# Patient Record
Sex: MALE | Race: OTHER | HISPANIC OR LATINO | URBAN - METROPOLITAN AREA
[De-identification: names, ages, dates, MRNs, and addresses within clinical notes are randomized per-mention and may not be internally consistent; named-entity substitution may affect disease eponyms.]

---

## 2019-03-07 ENCOUNTER — EMERGENCY (EMERGENCY)
Facility: HOSPITAL | Age: 39
LOS: 1 days | Discharge: HOME | End: 2019-03-07
Attending: EMERGENCY MEDICINE | Admitting: EMERGENCY MEDICINE
Payer: SELF-PAY

## 2019-03-07 VITALS
SYSTOLIC BLOOD PRESSURE: 140 MMHG | HEART RATE: 84 BPM | RESPIRATION RATE: 18 BRPM | DIASTOLIC BLOOD PRESSURE: 85 MMHG | OXYGEN SATURATION: 100 %

## 2019-03-07 VITALS
DIASTOLIC BLOOD PRESSURE: 100 MMHG | OXYGEN SATURATION: 99 % | HEART RATE: 88 BPM | SYSTOLIC BLOOD PRESSURE: 157 MMHG | HEIGHT: 62 IN | TEMPERATURE: 98 F | RESPIRATION RATE: 18 BRPM | WEIGHT: 170.42 LBS

## 2019-03-07 DIAGNOSIS — R10.9 UNSPECIFIED ABDOMINAL PAIN: ICD-10-CM

## 2019-03-07 LAB
ALBUMIN SERPL ELPH-MCNC: 4.2 G/DL — SIGNIFICANT CHANGE UP (ref 3.3–5)
ALP SERPL-CCNC: 76 U/L — SIGNIFICANT CHANGE UP (ref 40–120)
ALT FLD-CCNC: 62 U/L DA — HIGH (ref 10–45)
ANION GAP SERPL CALC-SCNC: 12 MMOL/L — SIGNIFICANT CHANGE UP (ref 5–17)
AST SERPL-CCNC: 34 U/L — SIGNIFICANT CHANGE UP (ref 10–40)
BASOPHILS # BLD AUTO: 0.1 K/UL — SIGNIFICANT CHANGE UP (ref 0–0.2)
BASOPHILS NFR BLD AUTO: 1.1 % — SIGNIFICANT CHANGE UP (ref 0–2)
BILIRUB SERPL-MCNC: 0.4 MG/DL — SIGNIFICANT CHANGE UP (ref 0.2–1.2)
BUN SERPL-MCNC: 17 MG/DL — SIGNIFICANT CHANGE UP (ref 7–23)
CALCIUM SERPL-MCNC: 9.2 MG/DL — SIGNIFICANT CHANGE UP (ref 8.4–10.5)
CHLORIDE SERPL-SCNC: 103 MMOL/L — SIGNIFICANT CHANGE UP (ref 96–108)
CO2 SERPL-SCNC: 26 MMOL/L — SIGNIFICANT CHANGE UP (ref 22–31)
CREAT SERPL-MCNC: 1.06 MG/DL — SIGNIFICANT CHANGE UP (ref 0.5–1.3)
EOSINOPHIL # BLD AUTO: 0.6 K/UL — HIGH (ref 0–0.5)
EOSINOPHIL NFR BLD AUTO: 6.2 % — HIGH (ref 0–6)
GLUCOSE SERPL-MCNC: 143 MG/DL — HIGH (ref 70–99)
HCT VFR BLD CALC: 46.8 % — SIGNIFICANT CHANGE UP (ref 39–50)
HGB BLD-MCNC: 15.9 G/DL — SIGNIFICANT CHANGE UP (ref 13–17)
LIDOCAIN IGE QN: 102 U/L — SIGNIFICANT CHANGE UP (ref 73–393)
LYMPHOCYTES # BLD AUTO: 3.1 K/UL — SIGNIFICANT CHANGE UP (ref 1–3.3)
LYMPHOCYTES # BLD AUTO: 31.9 % — SIGNIFICANT CHANGE UP (ref 13–44)
MCHC RBC-ENTMCNC: 29.8 PG — SIGNIFICANT CHANGE UP (ref 27–34)
MCHC RBC-ENTMCNC: 34 GM/DL — SIGNIFICANT CHANGE UP (ref 32–36)
MCV RBC AUTO: 87.6 FL — SIGNIFICANT CHANGE UP (ref 80–100)
MONOCYTES # BLD AUTO: 0.6 K/UL — SIGNIFICANT CHANGE UP (ref 0–0.9)
MONOCYTES NFR BLD AUTO: 6.6 % — SIGNIFICANT CHANGE UP (ref 1–9)
NEUTROPHILS # BLD AUTO: 5.3 K/UL — SIGNIFICANT CHANGE UP (ref 1.8–7.4)
NEUTROPHILS NFR BLD AUTO: 54.1 % — SIGNIFICANT CHANGE UP (ref 43–77)
PLATELET # BLD AUTO: 207 K/UL — SIGNIFICANT CHANGE UP (ref 150–400)
POTASSIUM SERPL-MCNC: 3.7 MMOL/L — SIGNIFICANT CHANGE UP (ref 3.5–5.3)
POTASSIUM SERPL-SCNC: 3.7 MMOL/L — SIGNIFICANT CHANGE UP (ref 3.5–5.3)
PROT SERPL-MCNC: 8.1 G/DL — SIGNIFICANT CHANGE UP (ref 6–8.3)
RBC # BLD: 5.34 M/UL — SIGNIFICANT CHANGE UP (ref 4.2–5.8)
RBC # FLD: 11.8 % — SIGNIFICANT CHANGE UP (ref 10.3–14.5)
SODIUM SERPL-SCNC: 141 MMOL/L — SIGNIFICANT CHANGE UP (ref 135–145)
WBC # BLD: 9.8 K/UL — SIGNIFICANT CHANGE UP (ref 3.8–10.5)
WBC # FLD AUTO: 9.8 K/UL — SIGNIFICANT CHANGE UP (ref 3.8–10.5)

## 2019-03-07 PROCEDURE — 96374 THER/PROPH/DIAG INJ IV PUSH: CPT

## 2019-03-07 PROCEDURE — 96375 TX/PRO/DX INJ NEW DRUG ADDON: CPT

## 2019-03-07 PROCEDURE — 99284 EMERGENCY DEPT VISIT MOD MDM: CPT | Mod: 25

## 2019-03-07 PROCEDURE — 85027 COMPLETE CBC AUTOMATED: CPT

## 2019-03-07 PROCEDURE — 99053 MED SERV 10PM-8AM 24 HR FAC: CPT

## 2019-03-07 PROCEDURE — 80053 COMPREHEN METABOLIC PANEL: CPT

## 2019-03-07 PROCEDURE — 83690 ASSAY OF LIPASE: CPT

## 2019-03-07 RX ORDER — FAMOTIDINE 10 MG/ML
20 INJECTION INTRAVENOUS ONCE
Qty: 0 | Refills: 0 | Status: COMPLETED | OUTPATIENT
Start: 2019-03-07 | End: 2019-03-07

## 2019-03-07 RX ORDER — ONDANSETRON 8 MG/1
4 TABLET, FILM COATED ORAL ONCE
Qty: 0 | Refills: 0 | Status: COMPLETED | OUTPATIENT
Start: 2019-03-07 | End: 2019-03-07

## 2019-03-07 RX ORDER — SODIUM CHLORIDE 9 MG/ML
1000 INJECTION INTRAMUSCULAR; INTRAVENOUS; SUBCUTANEOUS ONCE
Qty: 0 | Refills: 0 | Status: COMPLETED | OUTPATIENT
Start: 2019-03-07 | End: 2019-03-07

## 2019-03-07 RX ORDER — LANSOPRAZOLE 15 MG/1
1 CAPSULE, DELAYED RELEASE ORAL
Qty: 30 | Refills: 0
Start: 2019-03-07 | End: 2019-04-05

## 2019-03-07 RX ADMIN — Medication 30 MILLILITER(S): at 04:38

## 2019-03-07 RX ADMIN — ONDANSETRON 4 MILLIGRAM(S): 8 TABLET, FILM COATED ORAL at 04:39

## 2019-03-07 RX ADMIN — FAMOTIDINE 100 MILLIGRAM(S): 10 INJECTION INTRAVENOUS at 04:38

## 2019-03-07 RX ADMIN — SODIUM CHLORIDE 2000 MILLILITER(S): 9 INJECTION INTRAMUSCULAR; INTRAVENOUS; SUBCUTANEOUS at 04:39

## 2019-03-07 NOTE — ED PROVIDER NOTE - OBJECTIVE STATEMENT
39 y/o male with no sig pmhx presents to ed c/o sudden onset epigastric pain, burning type, no n/v/d /c, no fever

## 2019-03-07 NOTE — ED ADULT NURSE NOTE - NSIMPLEMENTINTERV_GEN_ALL_ED
Implemented All Universal Safety Interventions:  [place] to call system. Call bell, personal items and telephone within reach. Instruct patient to call for assistance. Room bathroom lighting operational. Non-slip footwear when patient is off stretcher. Physically safe environment: no spills, clutter or unnecessary equipment. Stretcher in lowest position, wheels locked, appropriate side rails in place.

## 2019-03-07 NOTE — ED PROVIDER NOTE - CLINICAL SUMMARY MEDICAL DECISION MAKING FREE TEXT BOX
pt has epigastric pain likely from gastritis, improved after IV pepcid, will discharge on PPI and ask him to follow up in clinic

## 2019-03-07 NOTE — ED PROVIDER NOTE - NSFOLLOWUPINSTRUCTIONS_ED_ALL_ED_FT
Drink plenty of liquids and take enough rest. if you are given any prescription take it as recommended. take Tylenol  for pain . Follow up with your primary care doctor or at family practice clinic in 1-2days. Return to ER if symptoms worsens or do not improve. WE are open 24 hours every day

## 2019-03-07 NOTE — ED ADULT NURSE NOTE - OBJECTIVE STATEMENT
Pt presents to the ED complaining of epigastric pain 8/10. Pain started about one hour ago. Pt denies nausea, vomiting and diarrhea. Pt with no prior medical history

## 2023-06-25 ENCOUNTER — EMERGENCY (EMERGENCY)
Facility: HOSPITAL | Age: 43
LOS: 1 days | Discharge: HOME | End: 2023-06-25
Attending: EMERGENCY MEDICINE | Admitting: EMERGENCY MEDICINE
Payer: MEDICAID

## 2023-06-25 VITALS
OXYGEN SATURATION: 96 % | TEMPERATURE: 98 F | DIASTOLIC BLOOD PRESSURE: 81 MMHG | HEART RATE: 86 BPM | SYSTOLIC BLOOD PRESSURE: 118 MMHG | RESPIRATION RATE: 17 BRPM | HEIGHT: 63 IN | WEIGHT: 160.06 LBS

## 2023-06-25 LAB — GLUCOSE BLDC GLUCOMTR-MCNC: 106 MG/DL — HIGH (ref 70–99)

## 2023-06-25 PROCEDURE — 99283 EMERGENCY DEPT VISIT LOW MDM: CPT

## 2023-06-25 PROCEDURE — 99284 EMERGENCY DEPT VISIT MOD MDM: CPT

## 2023-06-25 PROCEDURE — 96372 THER/PROPH/DIAG INJ SC/IM: CPT

## 2023-06-25 PROCEDURE — 82962 GLUCOSE BLOOD TEST: CPT

## 2023-06-25 RX ORDER — IBUPROFEN 200 MG
1 TABLET ORAL
Qty: 20 | Refills: 0
Start: 2023-06-25 | End: 2023-06-29

## 2023-06-25 RX ORDER — KETOROLAC TROMETHAMINE 30 MG/ML
30 SYRINGE (ML) INJECTION ONCE
Refills: 0 | Status: DISCONTINUED | OUTPATIENT
Start: 2023-06-25 | End: 2023-06-25

## 2023-06-25 RX ADMIN — Medication 300 MILLIGRAM(S): at 16:25

## 2023-06-25 RX ADMIN — Medication 30 MILLIGRAM(S): at 16:26

## 2023-06-25 NOTE — ED PROVIDER NOTE - PATIENT PORTAL LINK FT
You can access the FollowMyHealth Patient Portal offered by [facility] by registering at the following website: [email or web address]. By joining [employer]’s FollowMyHealth portal, you will also be able to view your health information using other applications (apps) compatible with our system.

## 2023-06-25 NOTE — ED PROVIDER NOTE - PHYSICAL EXAMINATION
Dental exam: #4 along the gingiva with local tenderness.  Erythema.  Right-sided upper facial swelling is noted on examination.  Patient in no acute distress.  Well-appearing and nontoxic.No lymphadenopathy appreciated on exam.

## 2023-06-25 NOTE — ED ADULT NURSE NOTE - NSFALLUNIVINTERV_ED_ALL_ED
Bed/Stretcher in lowest position, wheels locked, appropriate side rails in place/Call bell, personal items and telephone in reach/Instruct patient to call for assistance before getting out of bed/chair/stretcher/Non-slip footwear applied when patient is off stretcher/[place] to call system/Physically safe environment - no spills, clutter or unnecessary equipment/Purposeful proactive rounding/Room/bathroom lighting operational, light cord in reach

## 2023-06-25 NOTE — ED PROVIDER NOTE - NSFOLLOWUPINSTRUCTIONS_ED_ALL_ED_FT
Es probable que haya gay infección dental. Es importante que farzad un seguimiento con un dentista. Nos comunicaremos con usted mañana para ayudarlo con el seguimiento con gay ganesh con el dentista. Si no podemos programarlo con un dentista, incluiré gay lista de clínicas dentales a las que puede llamar para programar un seguimiento. Mientras tanto, tome los antibióticos según lo prescrito. También hemos proporcionado medicamentos según sea necesario para el dolor y la inflamación.

## 2023-06-25 NOTE — ED PROVIDER NOTE - CLINICAL SUMMARY MEDICAL DECISION MAKING FREE TEXT BOX
42-year-old male presents to the ED complaining of right upper facial swelling.  Patient denies pain.  He states that it started yesterday morning after breakfast.  He might have noted that he had some pain with chewing.  There is no fever or chills.  No trouble breathing.  No nausea or vomiting.  Patient does not have a relationship with a dentist at this moment.    Exam as stated.  Information entered into the green binder for follow-up with dentist.  Will provide antibiotics and anti-inflammatories as needed.  We will give patient a list of dental clinics to arrange follow-up outpatient.  All questions answered.

## 2023-06-25 NOTE — ED PROVIDER NOTE - OBJECTIVE STATEMENT
42-year-old male presents to the ED complaining of right upper facial swelling.  Patient denies pain.  He states that it started yesterday morning after breakfast.  He might have noted that he had some pain with chewing.  There is no fever or chills.  No trouble breathing.  No nausea or vomiting.  Patient does not have a relationship with a dentist at this moment.

## 2023-06-25 NOTE — ED PROVIDER NOTE - NSFOLLOWUPCLINICS_GEN_ALL_ED_FT
[facility]  Dental  [address] [place] [address]  Phone: [phone]  Fax:     [facility]  Dentistry  .  [place] [address]  Phone: [phone]  Fax:     [facility]  Dental  [address] [place] [id number]  Phone: [phone]  Fax:     [facility]  [place] [address]  Phone: [phone]  Fax:

## 2023-06-25 NOTE — ED PROVIDER NOTE - NSFOLLOWUPCLINICSTOKEN_GEN_ALL_ED_FT
[id number]: || ||00\01||False;[id number]: || ||00\01||False;[id number]: || ||00\01||False;[id number]: || ||00\01||False;

## 2023-06-25 NOTE — ED ADULT NURSE NOTE - OBJECTIVE STATEMENT
Assumed pt care for a 42 yr old male complaining of right sided facial swelling for about two days now. Pt reports he noted a lot of swelling to the right upper jaw. Pt reports a metallic taste/sour taste in mouth. Pt denies any allergies to anything. Pt denies any fever or chills. Meds given as per order.

## 2023-06-26 ENCOUNTER — EMERGENCY (EMERGENCY)
Facility: HOSPITAL | Age: 43
LOS: 1 days | Discharge: HOME | End: 2023-06-26
Attending: EMERGENCY MEDICINE | Admitting: EMERGENCY MEDICINE
Payer: MEDICAID

## 2023-06-26 VITALS
HEART RATE: 87 BPM | RESPIRATION RATE: 18 BRPM | DIASTOLIC BLOOD PRESSURE: 83 MMHG | WEIGHT: 169.98 LBS | SYSTOLIC BLOOD PRESSURE: 163 MMHG | HEIGHT: 63 IN | OXYGEN SATURATION: 98 % | TEMPERATURE: 99 F

## 2023-06-26 PROCEDURE — 99284 EMERGENCY DEPT VISIT MOD MDM: CPT

## 2023-06-26 NOTE — ED ADULT TRIAGE NOTE - BIRTH SEX
PROVIDER:[TOKEN:[[id number]],FOLLOWUP:[1-3 Days]] Male PROVIDER:[TOKEN:[[id number]],FOLLOWUP:[1-3 Days]],PROVIDER:[TOKEN:[[id number]],FOLLOWUP:[1-3 Days]],PROVIDER:[TOKEN:[[id number]],FOLLOWUP:[1-3 Days]]

## 2023-06-26 NOTE — ED ADULT TRIAGE NOTE - CHIEF COMPLAINT QUOTE
pt  seen  here yesterday with dental issues he was prescribed  clindanycin  and motrin   he presents tonight with right sided abd pain  starting 2 hours ago he states last bm was 2 hours ago

## 2023-06-27 PROCEDURE — 99283 EMERGENCY DEPT VISIT LOW MDM: CPT

## 2023-06-27 RX ORDER — LIDOCAINE 4 G/100G
10 CREAM TOPICAL ONCE
Refills: 0 | Status: DISCONTINUED | OUTPATIENT
Start: 2023-06-27 | End: 2023-06-30

## 2023-06-27 RX ORDER — AMOXICILLIN 250 MG/5ML
1 SUSPENSION, RECONSTITUTED, ORAL (ML) ORAL
Qty: 30 | Refills: 0
Start: 2023-06-27 | End: 2023-07-06

## 2023-06-27 NOTE — ED PROVIDER NOTE - PATIENT PORTAL LINK FT
You can access the FollowMyHealth Patient Portal offered by [facility] by registering at the following website: [email or web address]. By joining [employer]’s FollowMyHealth portal, you will also be able to view your health information using other applications (apps) compatible with our system.

## 2023-06-27 NOTE — ED PROVIDER NOTE - NSFOLLOWUPINSTRUCTIONS_ED_ALL_ED_FT
-- Stop taking clindamycin and start taking the new prescription of amoxicillin    -- Return to the ER for worsening or persistent symptoms, and/or ANY NEW OR CONCERNING SYMPTOMS.

## 2023-06-27 NOTE — ED PROVIDER NOTE - OBJECTIVE STATEMENT
pt was here for toothache yesterday and was started on clindamycin, pt states that after he took his first dose he had a mild stomach upset which recurred again after the next dose.  This evening he took another dose at 9:30p and 30 min later again had abdominal pain that was much worse, denies any vomiting.

## 2023-06-27 NOTE — ED ADULT NURSE NOTE - NS ED NURSE RECORD ANOTHER VITAL SIGN
Within functional limits Decreased anterior-posterior movement of the bolus/Stasis in lateral sulci Yes

## 2023-07-03 ENCOUNTER — OUTPATIENT (OUTPATIENT)
Dept: OUTPATIENT SERVICES | Facility: HOSPITAL | Age: 43
LOS: 1 days | End: 2023-07-03
Payer: MEDICAID

## 2023-07-03 ENCOUNTER — APPOINTMENT (OUTPATIENT)
Dept: FAMILY MEDICINE | Facility: HOSPITAL | Age: 43
End: 2023-07-03

## 2023-07-03 VITALS
SYSTOLIC BLOOD PRESSURE: 120 MMHG | HEART RATE: 76 BPM | TEMPERATURE: 98.7 F | DIASTOLIC BLOOD PRESSURE: 77 MMHG | RESPIRATION RATE: 17 BRPM | WEIGHT: 167 LBS | OXYGEN SATURATION: 98 % | BODY MASS INDEX: 28.67 KG/M2

## 2023-07-03 DIAGNOSIS — K08.9 DISORDER OF TEETH AND SUPPORTING STRUCTURES, UNSPECIFIED: ICD-10-CM

## 2023-07-03 DIAGNOSIS — Z87.891 PERSONAL HISTORY OF NICOTINE DEPENDENCE: ICD-10-CM

## 2023-07-03 DIAGNOSIS — Z11.59 ENCOUNTER FOR SCREENING FOR OTHER VIRAL DISEASES: ICD-10-CM

## 2023-07-03 DIAGNOSIS — Z11.4 ENCOUNTER FOR SCREENING FOR HUMAN IMMUNODEFICIENCY VIRUS [HIV]: ICD-10-CM

## 2023-07-03 DIAGNOSIS — Z00.00 ENCOUNTER FOR GENERAL ADULT MEDICAL EXAMINATION WITHOUT ABNORMAL FINDINGS: ICD-10-CM

## 2023-07-03 DIAGNOSIS — Z01.818 ENCOUNTER FOR OTHER PREPROCEDURAL EXAMINATION: ICD-10-CM

## 2023-07-03 DIAGNOSIS — R73.9 HYPERGLYCEMIA, UNSPECIFIED: ICD-10-CM

## 2023-07-03 DIAGNOSIS — Z13.220 ENCOUNTER FOR SCREENING FOR LIPOID DISORDERS: ICD-10-CM

## 2023-07-03 NOTE — HISTORY OF PRESENT ILLNESS
[FreeTextEntry1] : ED f/u, CPE, smoking cessation  [de-identified] : 42M h/o R inguinal hernia repaired 2012, here for f/u ED visit and to establish care. Patient was in the ED 6/25/23 complaining of right facial swelling and some pain with chewing, diagnosed with tooth infection given clindamycin. Patient was in the ED the next day 6/26/23 for abdominal pain after taking clindamycin which was reproducible after taking the clindamycin a second time, no nausea or vomiting. He was given amoxicillin instead, discharged again. \par \par Today he reports improvement in his tooth discomfort. He has had multiple procedures for tooth removal 2/2 poor dentition, but has not been to the dentist in many years. He has 4-5 more days of the amoxicillin left. He denies fevers, chills, abd pain, pus drainage from mouth, bad taste in mouth. He is interested in quitting smoking, previously smoked 1 pack per day from age [age] then cut down to 6 cigarettes per day, which he has remained at. Has not tried to completely quit in the past but is interested.

## 2023-07-03 NOTE — PHYSICAL EXAM
[No Acute Distress] : no acute distress [Well Nourished] : well nourished [Well Developed] : well developed [Normal Sclera/Conjunctiva] : normal sclera/conjunctiva [Normal] : no carotid or abdominal bruits heard, no varicosities, pedal pulses are present, no peripheral edema, no extremity clubbing or cyanosis and no palpable aorta [Normal Gait] : normal gait [Normal Affect] : the affect was normal [Normal Insight/Judgement] : insight and judgment were intact

## 2023-07-03 NOTE — HEALTH RISK ASSESSMENT
[Very Good] : ~his/her~  mood as very good [Yes] : Yes [Monthly or less (1 pt)] : Monthly or less (1 point) [3 or 4 (1 pt)] : 3 or 4  (1 point) [Never (0 pts)] : Never (0 points) [No] : In the past 12 months have you used drugs other than those required for medical reasons? No [0] : 2) Feeling down, depressed, or hopeless: Not at all (0) [PHQ-2 Negative - No further assessment needed] : PHQ-2 Negative - No further assessment needed [None] : None [With Family] : lives with family [Fully functional (bathing, dressing, toileting, transferring, walking, feeding)] : Fully functional (bathing, dressing, toileting, transferring, walking, feeding) [Fully functional (using the telephone, shopping, preparing meals, housekeeping, doing laundry, using] : Fully functional and needs no help or supervision to perform IADLs (using the telephone, shopping, preparing meals, housekeeping, doing laundry, using transportation, managing medications and managing finances) [Reports changes in dental health] : Reports changes in dental health [Current] : Current [15-19] : 15-19 [Audit-CScore] : 2 [GAT3Tggpv] : 0 [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

## 2023-07-11 ENCOUNTER — OUTPATIENT (OUTPATIENT)
Dept: OUTPATIENT SERVICES | Facility: HOSPITAL | Age: 43
LOS: 1 days | End: 2023-07-11

## 2023-07-11 DIAGNOSIS — R73.9 HYPERGLYCEMIA, UNSPECIFIED: ICD-10-CM

## 2023-07-11 DIAGNOSIS — Z11.4 ENCOUNTER FOR SCREENING FOR HUMAN IMMUNODEFICIENCY VIRUS [HIV]: ICD-10-CM

## 2023-07-11 DIAGNOSIS — Z13.29 ENCOUNTER FOR SCREENING FOR OTHER SUSPECTED ENDOCRINE DISORDER: ICD-10-CM

## 2023-07-11 DIAGNOSIS — Z13.220 ENCOUNTER FOR SCREENING FOR LIPOID DISORDERS: ICD-10-CM

## 2023-07-11 DIAGNOSIS — Z11.59 ENCOUNTER FOR SCREENING FOR OTHER VIRAL DISEASES: ICD-10-CM

## 2023-07-11 PROCEDURE — 80053 COMPREHEN METABOLIC PANEL: CPT

## 2023-07-11 PROCEDURE — 84443 ASSAY THYROID STIM HORMONE: CPT

## 2023-07-11 PROCEDURE — 80061 LIPID PANEL: CPT

## 2023-07-11 PROCEDURE — 83036 HEMOGLOBIN GLYCOSYLATED A1C: CPT

## 2023-07-11 PROCEDURE — 87389 HIV-1 AG W/HIV-1&-2 AB AG IA: CPT

## 2023-07-11 PROCEDURE — 85025 COMPLETE CBC W/AUTO DIFF WBC: CPT

## 2023-07-11 PROCEDURE — G0463: CPT

## 2023-07-11 PROCEDURE — 86803 HEPATITIS C AB TEST: CPT

## 2023-07-15 ENCOUNTER — NON-APPOINTMENT (OUTPATIENT)
Age: 43
End: 2023-07-15

## 2023-07-15 DIAGNOSIS — R74.01 ELEVATION OF LEVELS OF LIVER TRANSAMINASE LEVELS: ICD-10-CM

## 2023-07-15 LAB
ALBUMIN SERPL ELPH-MCNC: 5 G/DL
ALP BLD-CCNC: 91 U/L
ALT SERPL-CCNC: 70 U/L
ANION GAP SERPL CALC-SCNC: 14 MMOL/L
AST SERPL-CCNC: 30 U/L
BASOPHILS # BLD AUTO: 0.08 K/UL
BASOPHILS NFR BLD AUTO: 0.8 %
BILIRUB SERPL-MCNC: 0.3 MG/DL
BUN SERPL-MCNC: 15 MG/DL
CALCIUM SERPL-MCNC: 10.3 MG/DL
CHLORIDE SERPL-SCNC: 107 MMOL/L
CHOLEST SERPL-MCNC: 222 MG/DL
CO2 SERPL-SCNC: 22 MMOL/L
CREAT SERPL-MCNC: 1.16 MG/DL
EGFR: 81 ML/MIN/1.73M2
EOSINOPHIL # BLD AUTO: 0.39 K/UL
EOSINOPHIL NFR BLD AUTO: 4 %
ESTIMATED AVERAGE GLUCOSE: 120 MG/DL
GLUCOSE SERPL-MCNC: 98 MG/DL
HBA1C MFR BLD HPLC: 5.8 %
HCT VFR BLD CALC: 50.1 %
HCV AB SER QL: NONREACTIVE
HCV S/CO RATIO: 0.22 S/CO
HDLC SERPL-MCNC: 41 MG/DL
HGB BLD-MCNC: 16.2 G/DL
HIV1+2 AB SPEC QL IA.RAPID: NONREACTIVE
IMM GRANULOCYTES NFR BLD AUTO: 0.8 %
LDLC SERPL CALC-MCNC: 144 MG/DL
LYMPHOCYTES # BLD AUTO: 2.94 K/UL
LYMPHOCYTES NFR BLD AUTO: 30.2 %
MAN DIFF?: NORMAL
MCHC RBC-ENTMCNC: 29.5 PG
MCHC RBC-ENTMCNC: 32.3 GM/DL
MCV RBC AUTO: 91.3 FL
MONOCYTES # BLD AUTO: 0.48 K/UL
MONOCYTES NFR BLD AUTO: 4.9 %
NEUTROPHILS # BLD AUTO: 5.76 K/UL
NEUTROPHILS NFR BLD AUTO: 59.3 %
NONHDLC SERPL-MCNC: 181 MG/DL
PLATELET # BLD AUTO: 246 K/UL
POTASSIUM SERPL-SCNC: 5 MMOL/L
PROT SERPL-MCNC: 8 G/DL
RBC # BLD: 5.49 M/UL
RBC # FLD: 12.9 %
SODIUM SERPL-SCNC: 143 MMOL/L
TRIGL SERPL-MCNC: 206 MG/DL
TSH SERPL-ACNC: 2.1 UIU/ML
WBC # FLD AUTO: 9.73 K/UL

## 2023-07-24 ENCOUNTER — APPOINTMENT (OUTPATIENT)
Dept: FAMILY MEDICINE | Facility: HOSPITAL | Age: 43
End: 2023-07-24

## 2023-07-24 ENCOUNTER — OUTPATIENT (OUTPATIENT)
Dept: OUTPATIENT SERVICES | Facility: HOSPITAL | Age: 43
LOS: 1 days | End: 2023-07-24
Payer: MEDICAID

## 2023-07-24 VITALS
HEART RATE: 85 BPM | SYSTOLIC BLOOD PRESSURE: 116 MMHG | HEIGHT: 64 IN | DIASTOLIC BLOOD PRESSURE: 77 MMHG | TEMPERATURE: 98.3 F | BODY MASS INDEX: 28.51 KG/M2 | RESPIRATION RATE: 17 BRPM | OXYGEN SATURATION: 99 % | WEIGHT: 167 LBS

## 2023-07-24 DIAGNOSIS — Z00.00 ENCOUNTER FOR GENERAL ADULT MEDICAL EXAMINATION WITHOUT ABNORMAL FINDINGS: ICD-10-CM

## 2023-07-24 DIAGNOSIS — R74.01 ELEVATION OF LEVELS OF LIVER TRANSAMINASE LEVELS: ICD-10-CM

## 2023-07-24 PROCEDURE — 86704 HEP B CORE ANTIBODY TOTAL: CPT

## 2023-07-24 PROCEDURE — 80053 COMPREHEN METABOLIC PANEL: CPT

## 2023-07-24 PROCEDURE — 86708 HEPATITIS A ANTIBODY: CPT

## 2023-07-24 PROCEDURE — 87340 HEPATITIS B SURFACE AG IA: CPT

## 2023-07-24 PROCEDURE — 86803 HEPATITIS C AB TEST: CPT

## 2023-07-24 PROCEDURE — G0463: CPT

## 2023-07-24 PROCEDURE — 86706 HEP B SURFACE ANTIBODY: CPT

## 2023-08-06 LAB
ALBUMIN SERPL ELPH-MCNC: 5.3 G/DL
ALP BLD-CCNC: 84 U/L
ALT SERPL-CCNC: 58 U/L
ANION GAP SERPL CALC-SCNC: 12 MMOL/L
AST SERPL-CCNC: 26 U/L
BILIRUB SERPL-MCNC: 0.6 MG/DL
BUN SERPL-MCNC: 12 MG/DL
CALCIUM SERPL-MCNC: 10.3 MG/DL
CHLORIDE SERPL-SCNC: 103 MMOL/L
CO2 SERPL-SCNC: 25 MMOL/L
CREAT SERPL-MCNC: 1.06 MG/DL
EGFR: 90 ML/MIN/1.73M2
GLUCOSE SERPL-MCNC: 92 MG/DL
HBV CORE IGG+IGM SER QL: NONREACTIVE
HBV SURFACE AB SER QL: NONREACTIVE
HBV SURFACE AG SER QL: NONREACTIVE
HCV AB SER QL: NONREACTIVE
HCV S/CO RATIO: 0.19 S/CO
HEPATITIS A IGG ANTIBODY: REACTIVE
POTASSIUM SERPL-SCNC: 5.2 MMOL/L
PROT SERPL-MCNC: 7.9 G/DL
SODIUM SERPL-SCNC: 140 MMOL/L

## 2023-08-15 NOTE — HISTORY OF PRESENT ILLNESS
[FreeTextEntry1] : follow-up labs [de-identified] : - Patient is a 43 y/o M found to have an  isolated increase in ALT of 70 less than one month ago. \par - Denies use of alcohol, teas/herbal supplements.\par - Smokes 6 cigarettes a day \par - Presents today for further blood work regarding this matter. \par

## 2023-08-21 ENCOUNTER — OUTPATIENT (OUTPATIENT)
Dept: OUTPATIENT SERVICES | Facility: HOSPITAL | Age: 43
LOS: 1 days | End: 2023-08-21
Payer: MEDICAID

## 2023-08-21 ENCOUNTER — APPOINTMENT (OUTPATIENT)
Dept: FAMILY MEDICINE | Facility: HOSPITAL | Age: 43
End: 2023-08-21

## 2023-08-21 ENCOUNTER — MED ADMIN CHARGE (OUTPATIENT)
Age: 43
End: 2023-08-21

## 2023-08-21 VITALS
OXYGEN SATURATION: 96 % | RESPIRATION RATE: 16 BRPM | HEART RATE: 76 BPM | BODY MASS INDEX: 28.84 KG/M2 | WEIGHT: 168 LBS | TEMPERATURE: 98 F | SYSTOLIC BLOOD PRESSURE: 130 MMHG | DIASTOLIC BLOOD PRESSURE: 82 MMHG

## 2023-08-21 DIAGNOSIS — Z00.00 ENCOUNTER FOR GENERAL ADULT MEDICAL EXAMINATION W/OUT ABNORMAL FINDINGS: ICD-10-CM

## 2023-08-21 DIAGNOSIS — Z23 ENCOUNTER FOR IMMUNIZATION: ICD-10-CM

## 2023-08-21 DIAGNOSIS — R74.01 ELEVATION OF LEVELS OF LIVER TRANSAMINASE LEVELS: ICD-10-CM

## 2023-08-21 DIAGNOSIS — E78.2 MIXED HYPERLIPIDEMIA: ICD-10-CM

## 2023-08-21 DIAGNOSIS — Z13.220 ENCOUNTER FOR SCREENING FOR LIPOID DISORDERS: ICD-10-CM

## 2023-08-21 DIAGNOSIS — R77.0 ABNORMALITY OF ALBUMIN: ICD-10-CM

## 2023-08-21 DIAGNOSIS — R73.03 PREDIABETES.: ICD-10-CM

## 2023-08-21 DIAGNOSIS — Z13.29 ENCOUNTER FOR SCREENING FOR OTHER SUSPECTED ENDOCRINE DISORDER: ICD-10-CM

## 2023-08-21 DIAGNOSIS — F12.20 CANNABIS DEPENDENCE, UNCOMPLICATED: ICD-10-CM

## 2023-08-21 DIAGNOSIS — Z11.4 ENCOUNTER FOR SCREENING FOR HUMAN IMMUNODEFICIENCY VIRUS [HIV]: ICD-10-CM

## 2023-08-21 DIAGNOSIS — Z11.59 ENCOUNTER FOR SCREENING FOR OTHER VIRAL DISEASES: ICD-10-CM

## 2023-08-21 DIAGNOSIS — Z00.00 ENCOUNTER FOR GENERAL ADULT MEDICAL EXAMINATION WITHOUT ABNORMAL FINDINGS: ICD-10-CM

## 2023-08-21 PROCEDURE — G0463: CPT

## 2023-08-21 RX ORDER — NICOTINE POLACRILEX 4 MG/1
4 GUM, CHEWING ORAL
Qty: 1 | Refills: 0 | Status: DISCONTINUED | COMMUNITY
Start: 2023-07-03 | End: 2023-08-21

## 2023-08-21 NOTE — INTERPRETER SERVICES
[Other: ______] : provided by RINA [Interpreters_IDNumber] : [id number] [Interpreters_FullName] : Babak

## 2023-08-21 NOTE — END OF VISIT
[] : Resident [FreeTextEntry3] : resident has educated patient about diet and exercise; we will monitor the lipid in future and if high with proper diet and exercise he may benefit from medication.

## 2023-08-21 NOTE — HISTORY OF PRESENT ILLNESS
[FreeTextEntry1] : f/u elevated ALT  [de-identified] : 43 y/o M with PMHx overweight, smoker, HLD, preDM who is here for ALT elevation follow up. Persistent ALT elevation 58. Acute hepatitis panel unremarkable. Denies alcohol use. Denies OTC meds.  Hb B core antibody, Hb C antibody negative. Hb A IgG positive. Denies known episode of Hep A infection. Denies any FH of cancers.    Smoking 6 cigarettes a day, wants to quit smoking.   Denies fever, chills, headache, chest pain, SOB, abdominal pain, diarrhea, urinary issues or other concerns. Denies any sputum production/ cough.

## 2023-08-22 RX ORDER — NICOTINE 21 MG/24HR
14 PATCH, TRANSDERMAL 24 HOURS TRANSDERMAL DAILY
Qty: 1 | Refills: 0 | Status: ACTIVE | COMMUNITY
Start: 2023-07-03 | End: 1900-01-01

## 2023-09-18 ENCOUNTER — RESULT REVIEW (OUTPATIENT)
Age: 43
End: 2023-09-18

## 2023-09-18 ENCOUNTER — OUTPATIENT (OUTPATIENT)
Dept: OUTPATIENT SERVICES | Facility: HOSPITAL | Age: 43
LOS: 1 days | End: 2023-09-18
Payer: MEDICAID

## 2023-09-18 DIAGNOSIS — Z00.00 ENCOUNTER FOR GENERAL ADULT MEDICAL EXAMINATION WITHOUT ABNORMAL FINDINGS: ICD-10-CM

## 2023-09-18 DIAGNOSIS — R74.01 ELEVATION OF LEVELS OF LIVER TRANSAMINASE LEVELS: ICD-10-CM

## 2023-09-18 PROCEDURE — 76700 US EXAM ABDOM COMPLETE: CPT | Mod: 26

## 2023-09-18 PROCEDURE — 76700 US EXAM ABDOM COMPLETE: CPT

## 2023-09-25 DIAGNOSIS — K76.0 FATTY (CHANGE OF) LIVER, NOT ELSEWHERE CLASSIFIED: ICD-10-CM

## 2023-09-26 ENCOUNTER — OUTPATIENT (OUTPATIENT)
Dept: OUTPATIENT SERVICES | Facility: HOSPITAL | Age: 43
LOS: 1 days | End: 2023-09-26
Payer: MEDICAID

## 2023-09-26 ENCOUNTER — APPOINTMENT (OUTPATIENT)
Dept: FAMILY MEDICINE | Facility: HOSPITAL | Age: 43
End: 2023-09-26

## 2023-09-26 VITALS
DIASTOLIC BLOOD PRESSURE: 82 MMHG | WEIGHT: 169 LBS | RESPIRATION RATE: 16 BRPM | HEART RATE: 87 BPM | OXYGEN SATURATION: 97 % | BODY MASS INDEX: 29.01 KG/M2 | TEMPERATURE: 98.2 F | SYSTOLIC BLOOD PRESSURE: 125 MMHG

## 2023-09-26 DIAGNOSIS — Z23 ENCOUNTER FOR IMMUNIZATION: ICD-10-CM

## 2023-09-26 DIAGNOSIS — K80.20 CALCULUS OF GALLBLADDER WITHOUT CHOLECYSTITIS WITHOUT OBSTRUCTION: ICD-10-CM

## 2023-09-26 DIAGNOSIS — Z00.00 ENCOUNTER FOR GENERAL ADULT MEDICAL EXAMINATION WITHOUT ABNORMAL FINDINGS: ICD-10-CM

## 2023-09-26 DIAGNOSIS — K80.50 CALCULUS OF BILE DUCT WITHOUT CHOLANGITIS OR CHOLECYSTITIS WITHOUT OBSTRUCTION: ICD-10-CM

## 2023-09-26 DIAGNOSIS — F17.210 NICOTINE DEPENDENCE, CIGARETTES, UNCOMPLICATED: ICD-10-CM

## 2023-09-26 PROCEDURE — G0463: CPT

## 2023-09-26 RX ORDER — NICOTINE POLACRILEX 4 MG/1
4 GUM, CHEWING ORAL
Qty: 1 | Refills: 1 | Status: ACTIVE | COMMUNITY
Start: 2023-09-26 | End: 1900-01-01

## 2023-10-31 ENCOUNTER — OUTPATIENT (OUTPATIENT)
Dept: OUTPATIENT SERVICES | Facility: HOSPITAL | Age: 43
LOS: 1 days | End: 2023-10-31
Payer: MEDICAID

## 2023-10-31 ENCOUNTER — APPOINTMENT (OUTPATIENT)
Dept: FAMILY MEDICINE | Facility: HOSPITAL | Age: 43
End: 2023-10-31

## 2023-10-31 VITALS
BODY MASS INDEX: 29.52 KG/M2 | WEIGHT: 172 LBS | RESPIRATION RATE: 17 BRPM | DIASTOLIC BLOOD PRESSURE: 79 MMHG | SYSTOLIC BLOOD PRESSURE: 115 MMHG | OXYGEN SATURATION: 99 % | TEMPERATURE: 97.9 F | HEART RATE: 80 BPM

## 2023-10-31 DIAGNOSIS — F17.210 NICOTINE DEPENDENCE, CIGARETTES, UNCOMPLICATED: ICD-10-CM

## 2023-10-31 DIAGNOSIS — K80.50 CALCULUS OF BILE DUCT WITHOUT CHOLANGITIS OR CHOLECYSTITIS WITHOUT OBSTRUCTION: ICD-10-CM

## 2023-10-31 DIAGNOSIS — Z00.00 ENCOUNTER FOR GENERAL ADULT MEDICAL EXAMINATION WITHOUT ABNORMAL FINDINGS: ICD-10-CM

## 2023-10-31 PROCEDURE — G0463: CPT

## 2023-10-31 PROCEDURE — 99407 BEHAV CHNG SMOKING > 10 MIN: CPT

## 2023-11-01 ENCOUNTER — OUTPATIENT (OUTPATIENT)
Dept: OUTPATIENT SERVICES | Facility: HOSPITAL | Age: 43
LOS: 1 days | End: 2023-11-01
Payer: SELF-PAY

## 2023-11-01 ENCOUNTER — APPOINTMENT (OUTPATIENT)
Dept: SURGERY | Facility: HOSPITAL | Age: 43
End: 2023-11-01

## 2023-11-01 ENCOUNTER — NON-APPOINTMENT (OUTPATIENT)
Age: 43
End: 2023-11-01

## 2023-11-01 VITALS
HEART RATE: 87 BPM | DIASTOLIC BLOOD PRESSURE: 77 MMHG | RESPIRATION RATE: 16 BRPM | WEIGHT: 170 LBS | BODY MASS INDEX: 29.18 KG/M2 | OXYGEN SATURATION: 97 % | SYSTOLIC BLOOD PRESSURE: 112 MMHG | TEMPERATURE: 97.4 F

## 2023-11-01 DIAGNOSIS — K80.20 CALCULUS OF GALLBLADDER W/OUT CHOLECYSTITIS W/OUT OBSTRUCTION: ICD-10-CM

## 2023-11-01 DIAGNOSIS — Z00.00 ENCOUNTER FOR GENERAL ADULT MEDICAL EXAMINATION WITHOUT ABNORMAL FINDINGS: ICD-10-CM

## 2023-11-01 DIAGNOSIS — K80.50 CALCULUS OF BILE DUCT WITHOUT CHOLANGITIS OR CHOLECYSTITIS WITHOUT OBSTRUCTION: ICD-10-CM

## 2023-11-01 DIAGNOSIS — K80.50 CALCULUS OF BILE DUCT W/OUT CHOLANGITIS OR CHOLECYSTITIS W/OUT OBSTRUCTION: ICD-10-CM

## 2023-11-01 DIAGNOSIS — K80.20 CALCULUS OF GALLBLADDER WITHOUT CHOLECYSTITIS WITHOUT OBSTRUCTION: ICD-10-CM

## 2023-11-01 PROCEDURE — G0463: CPT

## 2023-11-02 ENCOUNTER — OUTPATIENT (OUTPATIENT)
Dept: OUTPATIENT SERVICES | Facility: HOSPITAL | Age: 43
LOS: 1 days | End: 2023-11-02
Payer: SELF-PAY

## 2023-11-02 VITALS
OXYGEN SATURATION: 96 % | TEMPERATURE: 99 F | WEIGHT: 170.2 LBS | RESPIRATION RATE: 16 BRPM | HEART RATE: 89 BPM | SYSTOLIC BLOOD PRESSURE: 113 MMHG | DIASTOLIC BLOOD PRESSURE: 78 MMHG | HEIGHT: 64 IN

## 2023-11-02 DIAGNOSIS — K81.0 ACUTE CHOLECYSTITIS: ICD-10-CM

## 2023-11-02 DIAGNOSIS — Z98.890 OTHER SPECIFIED POSTPROCEDURAL STATES: Chronic | ICD-10-CM

## 2023-11-02 DIAGNOSIS — Z01.818 ENCOUNTER FOR OTHER PREPROCEDURAL EXAMINATION: ICD-10-CM

## 2023-11-02 LAB
ALBUMIN SERPL ELPH-MCNC: 4.4 G/DL — SIGNIFICANT CHANGE UP (ref 3.3–5)
ALBUMIN SERPL ELPH-MCNC: 4.4 G/DL — SIGNIFICANT CHANGE UP (ref 3.3–5)
ALP SERPL-CCNC: 84 U/L — SIGNIFICANT CHANGE UP (ref 40–120)
ALP SERPL-CCNC: 84 U/L — SIGNIFICANT CHANGE UP (ref 40–120)
ALT FLD-CCNC: 62 U/L — HIGH (ref 10–45)
ALT FLD-CCNC: 62 U/L — HIGH (ref 10–45)
ANION GAP SERPL CALC-SCNC: 6 MMOL/L — SIGNIFICANT CHANGE UP (ref 5–17)
ANION GAP SERPL CALC-SCNC: 6 MMOL/L — SIGNIFICANT CHANGE UP (ref 5–17)
AST SERPL-CCNC: 20 U/L — SIGNIFICANT CHANGE UP (ref 10–40)
AST SERPL-CCNC: 20 U/L — SIGNIFICANT CHANGE UP (ref 10–40)
BILIRUB SERPL-MCNC: 0.4 MG/DL — SIGNIFICANT CHANGE UP (ref 0.2–1.2)
BILIRUB SERPL-MCNC: 0.4 MG/DL — SIGNIFICANT CHANGE UP (ref 0.2–1.2)
BLD GP AB SCN SERPL QL: SIGNIFICANT CHANGE UP
BLD GP AB SCN SERPL QL: SIGNIFICANT CHANGE UP
BUN SERPL-MCNC: 11 MG/DL — SIGNIFICANT CHANGE UP (ref 7–23)
BUN SERPL-MCNC: 11 MG/DL — SIGNIFICANT CHANGE UP (ref 7–23)
CALCIUM SERPL-MCNC: 9.4 MG/DL — SIGNIFICANT CHANGE UP (ref 8.4–10.5)
CALCIUM SERPL-MCNC: 9.4 MG/DL — SIGNIFICANT CHANGE UP (ref 8.4–10.5)
CHLORIDE SERPL-SCNC: 102 MMOL/L — SIGNIFICANT CHANGE UP (ref 96–108)
CHLORIDE SERPL-SCNC: 102 MMOL/L — SIGNIFICANT CHANGE UP (ref 96–108)
CO2 SERPL-SCNC: 29 MMOL/L — SIGNIFICANT CHANGE UP (ref 22–31)
CO2 SERPL-SCNC: 29 MMOL/L — SIGNIFICANT CHANGE UP (ref 22–31)
CREAT SERPL-MCNC: 1.09 MG/DL — SIGNIFICANT CHANGE UP (ref 0.5–1.3)
CREAT SERPL-MCNC: 1.09 MG/DL — SIGNIFICANT CHANGE UP (ref 0.5–1.3)
EGFR: 87 ML/MIN/1.73M2 — SIGNIFICANT CHANGE UP
EGFR: 87 ML/MIN/1.73M2 — SIGNIFICANT CHANGE UP
GLUCOSE SERPL-MCNC: 87 MG/DL — SIGNIFICANT CHANGE UP (ref 70–99)
GLUCOSE SERPL-MCNC: 87 MG/DL — SIGNIFICANT CHANGE UP (ref 70–99)
HCT VFR BLD CALC: 47.6 % — SIGNIFICANT CHANGE UP (ref 39–50)
HCT VFR BLD CALC: 47.6 % — SIGNIFICANT CHANGE UP (ref 39–50)
HGB BLD-MCNC: 16.7 G/DL — SIGNIFICANT CHANGE UP (ref 13–17)
HGB BLD-MCNC: 16.7 G/DL — SIGNIFICANT CHANGE UP (ref 13–17)
MCHC RBC-ENTMCNC: 29.9 PG — SIGNIFICANT CHANGE UP (ref 27–34)
MCHC RBC-ENTMCNC: 29.9 PG — SIGNIFICANT CHANGE UP (ref 27–34)
MCHC RBC-ENTMCNC: 35.1 GM/DL — SIGNIFICANT CHANGE UP (ref 32–36)
MCHC RBC-ENTMCNC: 35.1 GM/DL — SIGNIFICANT CHANGE UP (ref 32–36)
MCV RBC AUTO: 85.3 FL — SIGNIFICANT CHANGE UP (ref 80–100)
MCV RBC AUTO: 85.3 FL — SIGNIFICANT CHANGE UP (ref 80–100)
NRBC # BLD: 0 /100 WBCS — SIGNIFICANT CHANGE UP (ref 0–0)
NRBC # BLD: 0 /100 WBCS — SIGNIFICANT CHANGE UP (ref 0–0)
PLATELET # BLD AUTO: 229 K/UL — SIGNIFICANT CHANGE UP (ref 150–400)
PLATELET # BLD AUTO: 229 K/UL — SIGNIFICANT CHANGE UP (ref 150–400)
POTASSIUM SERPL-MCNC: 3.7 MMOL/L — SIGNIFICANT CHANGE UP (ref 3.5–5.3)
POTASSIUM SERPL-MCNC: 3.7 MMOL/L — SIGNIFICANT CHANGE UP (ref 3.5–5.3)
POTASSIUM SERPL-SCNC: 3.7 MMOL/L — SIGNIFICANT CHANGE UP (ref 3.5–5.3)
POTASSIUM SERPL-SCNC: 3.7 MMOL/L — SIGNIFICANT CHANGE UP (ref 3.5–5.3)
PROT SERPL-MCNC: 8.4 G/DL — HIGH (ref 6–8.3)
PROT SERPL-MCNC: 8.4 G/DL — HIGH (ref 6–8.3)
RBC # BLD: 5.58 M/UL — SIGNIFICANT CHANGE UP (ref 4.2–5.8)
RBC # BLD: 5.58 M/UL — SIGNIFICANT CHANGE UP (ref 4.2–5.8)
RBC # FLD: 11.7 % — SIGNIFICANT CHANGE UP (ref 10.3–14.5)
RBC # FLD: 11.7 % — SIGNIFICANT CHANGE UP (ref 10.3–14.5)
SODIUM SERPL-SCNC: 137 MMOL/L — SIGNIFICANT CHANGE UP (ref 135–145)
SODIUM SERPL-SCNC: 137 MMOL/L — SIGNIFICANT CHANGE UP (ref 135–145)
WBC # BLD: 9.32 K/UL — SIGNIFICANT CHANGE UP (ref 3.8–10.5)
WBC # BLD: 9.32 K/UL — SIGNIFICANT CHANGE UP (ref 3.8–10.5)
WBC # FLD AUTO: 9.32 K/UL — SIGNIFICANT CHANGE UP (ref 3.8–10.5)
WBC # FLD AUTO: 9.32 K/UL — SIGNIFICANT CHANGE UP (ref 3.8–10.5)

## 2023-11-02 PROCEDURE — 93010 ELECTROCARDIOGRAM REPORT: CPT | Mod: NC

## 2023-11-02 PROCEDURE — 86850 RBC ANTIBODY SCREEN: CPT

## 2023-11-02 PROCEDURE — 36415 COLL VENOUS BLD VENIPUNCTURE: CPT

## 2023-11-02 PROCEDURE — 85027 COMPLETE CBC AUTOMATED: CPT

## 2023-11-02 PROCEDURE — 80053 COMPREHEN METABOLIC PANEL: CPT

## 2023-11-02 PROCEDURE — G0463: CPT

## 2023-11-02 PROCEDURE — 93005 ELECTROCARDIOGRAM TRACING: CPT

## 2023-11-02 PROCEDURE — 86900 BLOOD TYPING SEROLOGIC ABO: CPT

## 2023-11-02 PROCEDURE — 86901 BLOOD TYPING SEROLOGIC RH(D): CPT

## 2023-11-02 NOTE — H&P PST ADULT - HISTORY OF PRESENT ILLNESS
This is a 42 y/o male with PMHX of every day smoker, HLD, prediabetes, fatty liver, who presents to PST with pre-operative diagnosis of acute cholecystitis. H/o cholelithiasis noted US. Pt reports RUQ pain after fatty meals especially with high fat content. Pt reports experiencing RUQ pains today. Otherwise patient feels well and denies any acute symptoms.

## 2023-11-03 PROBLEM — K80.20 CHOLELITHIASIS: Status: ACTIVE | Noted: 2023-09-25

## 2023-11-08 ENCOUNTER — TRANSCRIPTION ENCOUNTER (OUTPATIENT)
Age: 43
End: 2023-11-08

## 2023-11-09 ENCOUNTER — APPOINTMENT (OUTPATIENT)
Dept: SURGERY | Facility: HOSPITAL | Age: 43
End: 2023-11-09

## 2023-11-09 ENCOUNTER — OUTPATIENT (OUTPATIENT)
Dept: OUTPATIENT SERVICES | Facility: HOSPITAL | Age: 43
LOS: 1 days | End: 2023-11-09
Payer: SELF-PAY

## 2023-11-09 ENCOUNTER — RESULT REVIEW (OUTPATIENT)
Age: 43
End: 2023-11-09

## 2023-11-09 ENCOUNTER — TRANSCRIPTION ENCOUNTER (OUTPATIENT)
Age: 43
End: 2023-11-09

## 2023-11-09 VITALS
HEART RATE: 103 BPM | TEMPERATURE: 99 F | DIASTOLIC BLOOD PRESSURE: 74 MMHG | RESPIRATION RATE: 16 BRPM | OXYGEN SATURATION: 95 % | SYSTOLIC BLOOD PRESSURE: 118 MMHG

## 2023-11-09 VITALS
RESPIRATION RATE: 14 BRPM | HEIGHT: 64 IN | OXYGEN SATURATION: 96 % | TEMPERATURE: 98 F | HEART RATE: 70 BPM | SYSTOLIC BLOOD PRESSURE: 108 MMHG | WEIGHT: 170.2 LBS | DIASTOLIC BLOOD PRESSURE: 65 MMHG

## 2023-11-09 DIAGNOSIS — K81.0 ACUTE CHOLECYSTITIS: ICD-10-CM

## 2023-11-09 DIAGNOSIS — Z98.890 OTHER SPECIFIED POSTPROCEDURAL STATES: Chronic | ICD-10-CM

## 2023-11-09 LAB
ABO RH CONFIRMATION: SIGNIFICANT CHANGE UP
ABO RH CONFIRMATION: SIGNIFICANT CHANGE UP

## 2023-11-09 PROCEDURE — 47562 LAPAROSCOPIC CHOLECYSTECTOMY: CPT | Mod: AS

## 2023-11-09 PROCEDURE — C1889: CPT

## 2023-11-09 PROCEDURE — 47562 LAPAROSCOPIC CHOLECYSTECTOMY: CPT

## 2023-11-09 PROCEDURE — 36415 COLL VENOUS BLD VENIPUNCTURE: CPT

## 2023-11-09 PROCEDURE — 88304 TISSUE EXAM BY PATHOLOGIST: CPT

## 2023-11-09 PROCEDURE — 88304 TISSUE EXAM BY PATHOLOGIST: CPT | Mod: 26

## 2023-11-09 PROCEDURE — C9399: CPT

## 2023-11-09 DEVICE — CLIP APPLIER COVIDIEN ENDOCLIP II 10MM MED/LG: Type: IMPLANTABLE DEVICE | Status: FUNCTIONAL

## 2023-11-09 RX ORDER — ONDANSETRON 8 MG/1
4 TABLET, FILM COATED ORAL ONCE
Refills: 0 | Status: DISCONTINUED | OUTPATIENT
Start: 2023-11-09 | End: 2023-11-09

## 2023-11-09 RX ORDER — HYDROMORPHONE HYDROCHLORIDE 2 MG/ML
0.5 INJECTION INTRAMUSCULAR; INTRAVENOUS; SUBCUTANEOUS
Refills: 0 | Status: DISCONTINUED | OUTPATIENT
Start: 2023-11-09 | End: 2023-11-09

## 2023-11-09 RX ORDER — OXYCODONE HYDROCHLORIDE 5 MG/1
1 TABLET ORAL
Qty: 10 | Refills: 0
Start: 2023-11-09

## 2023-11-09 RX ORDER — SODIUM CHLORIDE 9 MG/ML
1000 INJECTION, SOLUTION INTRAVENOUS
Refills: 0 | Status: DISCONTINUED | OUTPATIENT
Start: 2023-11-09 | End: 2023-11-09

## 2023-11-09 RX ORDER — OXYCODONE HYDROCHLORIDE 5 MG/1
5 TABLET ORAL EVERY 6 HOURS
Refills: 0 | Status: DISCONTINUED | OUTPATIENT
Start: 2023-11-09 | End: 2023-11-09

## 2023-11-09 NOTE — ASU PATIENT PROFILE, ADULT - FALL HARM RISK - UNIVERSAL INTERVENTIONS
Bed in lowest position, wheels locked, appropriate side rails in place/Call bell, personal items and telephone in reach/Instruct patient to call for assistance before getting out of bed or chair/Non-slip footwear when patient is out of bed/[place] to call system/Physically safe environment - no spills, clutter or unnecessary equipment/Purposeful Proactive Rounding/Room/bathroom lighting operational, light cord in reach

## 2023-11-09 NOTE — ASU DISCHARGE PLAN (ADULT/PEDIATRIC) - NS MD DC FALL RISK RISK
For information on Fall & Injury Prevention, visit: [email or web address] OR  [email or web address] OR  https://www.cdc.gov/steadi/patient.html

## 2023-11-09 NOTE — ASU PREOP CHECKLIST - SITE MARKED BY ANESTHESIOLOGIST
examined in ENT clinic by Dr. Juan M Platt today and CT scan was ordered. Please see the history as above and I confirmed those findings patient is having symptoms for last 3 weeks with pain in right ear decreased hearing and jaw and cheek pain but no redness or fluctuation on the ear pinna or the cheek he had received Augmentin and then another antibiotic in between and apparently repeat Augmentin still was symptomatic with pain and was examined by ENT today and was admitted for malignant otitis externa. Patient is alert awake no fever no chills hemodynamically stable blood pressure was 1 69-6 60 systolic he does have history of hypertension no history of diabetes no history of taking immunosuppressive medication. Cultures were done by ENT. ENT recommended broad-spectrum antibiotic on Cipro eardrops. CT scan was ordered by Dr. Juan M Platt of soft tissue neck which was negative CT of the internal auditory canal with contrast showed mild circumferential thickening of the right external auditory canal fluid in the right middle ear cavity and fluid throughout the right mastoid air cells. Dr. Juan M Platt had reviewed the CT scan of the internal auditory canal and soft tissue neck and agreed to transfer patient to stepdown unit and no plan for surgical intervention today. Patient is started on IV cefepime and vancomycin and infectious disease was consulted for IV antibiotics per recommendation by ENT. We have discussed with patient and on-call ENT physician also came and discussed with patient as patient was reluctant to get IV antibiotics and also reluctant for staying in the hospital.  Dr. Juan M Platt and on-call ENT physician have both discussed with patient at different times and patient agreed to stay and to get IV antibiotics. We will transfer patient to stepdown unit with medicine team and critical care team will sign off.   Patient will need continued follow-up with ENT and ID physician    Discussed with nursing staff,
n/a

## 2023-11-09 NOTE — ASU DISCHARGE PLAN (ADULT/PEDIATRIC) - CARE PROVIDER_API CALL
Jalil Hoang.  Surgery  [address], [address]  [place] [address]  Phone: [phone]  Fax: [phone]  Follow Up Time: 2 weeks

## 2023-11-16 LAB
SURGICAL PATHOLOGY STUDY: SIGNIFICANT CHANGE UP
SURGICAL PATHOLOGY STUDY: SIGNIFICANT CHANGE UP

## 2023-11-22 ENCOUNTER — APPOINTMENT (OUTPATIENT)
Dept: SURGERY | Facility: HOSPITAL | Age: 43
End: 2023-11-22

## 2023-11-22 ENCOUNTER — OUTPATIENT (OUTPATIENT)
Dept: OUTPATIENT SERVICES | Facility: HOSPITAL | Age: 43
LOS: 1 days | End: 2023-11-22
Payer: SELF-PAY

## 2023-11-22 VITALS
SYSTOLIC BLOOD PRESSURE: 140 MMHG | WEIGHT: 168 LBS | DIASTOLIC BLOOD PRESSURE: 64 MMHG | HEART RATE: 89 BPM | TEMPERATURE: 98.4 F | RESPIRATION RATE: 14 BRPM | BODY MASS INDEX: 28.84 KG/M2

## 2023-11-22 VITALS
SYSTOLIC BLOOD PRESSURE: 132 MMHG | DIASTOLIC BLOOD PRESSURE: 81 MMHG | RESPIRATION RATE: 14 BRPM | WEIGHT: 168 LBS | TEMPERATURE: 98.4 F | HEART RATE: 89 BPM | BODY MASS INDEX: 28.84 KG/M2 | OXYGEN SATURATION: 96 %

## 2023-11-22 DIAGNOSIS — Z90.49 ACQUIRED ABSENCE OF OTHER SPECIFIED PARTS OF DIGESTIVE TRACT: ICD-10-CM

## 2023-11-22 DIAGNOSIS — Z00.00 ENCOUNTER FOR GENERAL ADULT MEDICAL EXAMINATION WITHOUT ABNORMAL FINDINGS: ICD-10-CM

## 2023-11-22 DIAGNOSIS — Z98.890 OTHER SPECIFIED POSTPROCEDURAL STATES: Chronic | ICD-10-CM

## 2023-11-22 PROBLEM — K81.0 ACUTE CHOLECYSTITIS: Chronic | Status: ACTIVE | Noted: 2023-11-02

## 2023-11-22 PROBLEM — E78.5 HYPERLIPIDEMIA, UNSPECIFIED: Chronic | Status: ACTIVE | Noted: 2023-11-02

## 2023-11-22 PROBLEM — K76.0 FATTY (CHANGE OF) LIVER, NOT ELSEWHERE CLASSIFIED: Chronic | Status: ACTIVE | Noted: 2023-11-02

## 2023-11-22 PROCEDURE — G0463: CPT

## 2023-11-27 ENCOUNTER — APPOINTMENT (OUTPATIENT)
Dept: FAMILY MEDICINE | Facility: HOSPITAL | Age: 43
End: 2023-11-27

## 2024-01-16 NOTE — ED ADULT NURSE NOTE - NSFALLOOBATTEMPT_ED_ALL_ED
Patient Education     4 Steps for Eating Healthier  Changing the way you eat can improve your health. It can lower your cholesterol and blood pressure, and help you stay at a healthy weight. Your diet doesn’t have to be bland and boring to be healthy. Just watch your calories and follow these steps:    Step 1. Eat fewer unhealthy fats  Choose more fish and lean meats instead of fatty cuts of meat.  Skip butter and lard, and use less margarine.  Pass on foods that have palm, coconut, or hydrogenated oils.  Eat fewer high-fat dairy foods like cheese, ice cream, and whole milk.  Get a heart-healthy cookbook and try some low-fat recipes.  Step 2. Go light on salt  Keep the saltshaker off the table.  Limit high-salt ingredients, such as soy sauce, bouillon, and garlic salt.  Instead of adding salt when cooking, season your food with herbs and flavorings. Try lemon, garlic, and onion, or salt-free herb seasonings.  Limit convenience foods, such as boxed or canned foods and restaurant food.  Read food labels and choose lower-sodium options.  Step 3. Limit sugar  Pause before you add sugars to pancakes, cereal, coffee, or tea. This includes white and brown table sugar, syrup, honey, and molasses. Cut your usual amount by half.  Use non-sugar sweeteners. Stevia, aspartame, and sucralose can satisfy a sweet tooth without adding calories.  Swap out sugar-filled soda and other drinks. Buy sugar-free or low-calorie beverages. Remember water is always the best choice.  Read labels and choose foods with less added sugar. Keep in mind that dairy foods and foods with fruit will have some natural sugar.  Cut the sugar in recipes by 1/3 to 1/2. Boost the flavor with extracts like almond, vanilla, or orange. Or add spices such as cinnamon or nutmeg.  Step 4. Eat more fiber  Eat fresh fruits and vegetables every day.  Boost your diet with whole grains. Go for oats, whole-grain rice, and bran.  Add beans and lentils to your meals.  Drink  more water to match your fiber increase to help prevent constipation.  Date Last Reviewed: 6/1/2017  © 1097-4018 [employer]. [address] [place] [address], [place] [address]. All rights reserved. This information is not intended as a substitute for professional medical care. Always follow your healthcare professional's instructions.            No

## 2025-01-03 NOTE — ED ADULT NURSE NOTE - [id number]_ED_A_ED
ED Attending Physician Note      Patient : Estela Manzo Age: 4 year old Sex: female   MRN: [id number] Encounter Date: 1/3/2025    History     Chief Complaint   Patient presents with    Ear Pain       HPI    Pt is a 4 year old female with a pmh of prematurity, BPD, Conti sydndrome, Asthma who presents with right ear pain. Patient was seen in the emergency department on 12/31/2024 with fever cough and congestion.  Patient was diagnosed with RSV on 12/27/2024.  In the ED on 12/31 patient given albuterol and was discharged home.  Patient also prescribed dexamethasone. Pt woke up last night with right ear pain. Tylenol given but still having pain. No fevers. Cough and congestion are improving but symptoms still present. Pt was given azithro last week for a possible pneumonia. Still tolerating G-tube feeds. Urinating normally. No difficulty breathing.           No Known Allergies    Current Discharge Medication List        Prior to Admission Medications    Details   triamcinolone (KENALOG) 0.5 % cream [None received]      budesonide-formoterol (SYMBICORT) 80-4.5 MCG/ACT inhaler Inhale 2 puffs into the lungs in the morning and 2 puffs in the evening.  Qty: 10.2 g, Refills: 5      fluticasone (FLONASE) 50 MCG/ACT nasal spray Spray 1 spray in each nostril daily.  Qty: 16 g, Refills: 12      albuterol 108 (90 Base) MCG/ACT inhaler Inhale 4 puffs into the lungs every 4 hours.  Qty: 1 each, Refills: 12             Current Discharge Medication List        New Prescriptions    Details   amoxicillin (AMOXIL) 400 MG/5ML suspension Take 7 mLs by mouth in the morning and 7 mLs in the evening. Do all this for 5 days.  Qty: 70 mL, Refills: 0             Past Medical History:   Diagnosis Date    Acute respiratory failure with hypoxemia  (CMD) 09/17/2022    Adenoid hypertrophy 12/30/2022    Asthma (CMD)     BPD (bronchopulmonary dysplasia)  (CMD)     Breech presentation (CMD) 2021    Delayed  vaccination -[facility] Policy signed 1/3 12/20/2022    Pneumonia due to infectious organism 09/18/2022    Premature infant of 29 weeks gestation (CMD)     Prematurity, birth weight 750-999 grams, with 29-30 completed weeks of gestation (CMD) 2021    Conti syndrome (CMD)     Vaccination not carried out because of parent refusal 02/11/2023    Vaccine refused by parent 12/20/2022    -[facility] vaccine Refusal signed 1/3       Past Surgical History:   Procedure Laterality Date    Gastrostomy tube placement      Removal adenoids,primary,<13 y/o  12/30/2022    Removal of tonsils,<13 y/o      August 27, 2024    Service to gastroenterology      Tympanostomy Bilateral 12/30/2022       Family History   Problem Relation Age of Onset    Patient is unaware of any medical problems Mother     Patient is unaware of any medical problems Father     Patient is unaware of any medical problems Sister     Asthma Maternal Uncle        Social History     Tobacco Use    Smoking status: Never     Passive exposure: Never    Smokeless tobacco: Never   Vaping Use    Vaping status: never used   Substance Use Topics    Drug use: Never       Review of Systems     Constitutional - Reviewed and are neg except as above  Eyes - Reviewed and are neg except as above  ENT - Reviewed and are neg except as above  Cardiovascular - Reviewed and are neg except as above  Respiratory - Reviewed and are neg except as above  GI - Reviewed and are neg except as above   - Reviewed and are neg except as above  MS - Reviewed and are neg except as above  Skin - Reviewed and are neg except as above  Neuro - Reviewed and are neg except as above  Heme/Lymph - Reviewed and are neg except as above    Physical Exam     Vitals:    01/03/25 0630   Pulse: 133   Resp: (!) 34   Temp: 98.3 °F (36.8 °C)   TempSrc: Oral   SpO2: 96%       GENERAL: Alert in no acute distress  HEENT: Atraumatic, PERRLA, EOMI, no posterior pharyngeal erythema or exudates, left TM unremarkable.   Right TM with mild erythema and faint effusion but no significant bulging and no mastoid swelling bilaterally.  NECK: Supple, no prominent cervical lymphadenopathy  LUNGS: Breathing comfortably.  Occasional end expiratory wheeze but not persistent during my exam, no crackles, no retractions, no signs of respiratory distress  HEART: RRR, +S1S2, no murmur appreciated  GI: normoactive bowel sounds, non-tender, non-distended, no rebound or guarding, no hepatosplenomegaly  EXT: No cyanosis or edema. No joint swelling or deformity  SKIN: Warm and well perfused. No rashes or bruising  NEURO: No obvious focal neuro deficits, mental status appropriate        Medical Decision Making                  Pt is a 4 year old female with a pmh of prematurity, BPD, Conti sydndrome, Asthma who presents with right ear pain.  Right ear pain started last night.  Patient has recently been diagnosed with RSV.  Completed course of azithromycin last week.  Still tolerating feeds.  Last night developed right ear pain.  No longer having fever.  On exam patient is breathing comfortably.  Occasional wheeze but not persistent or my exam.  Right ear with minimal erythema very faint effusion but no mastoid swelling bilaterally.  Symptoms seem consistent with a developing right acute otitis media with no evidence of mastoiditis.  Patient breathing comfortably and has resolving symptoms consistent with RSV.  Labs or imaging required at this time.  Will plan to discharge patient home on amoxicillin for suspected right acute otitis media with instructions to follow-up with PCP and strict return instructions.  Patient stable for discharge    Guardian and or patient verbalized understanding      MDM done in ED Course and as above.        ED Course              Procedures     Procedures    Lab Results     No results found for this visit on 01/03/25.      Radiology Results     Imaging Results    None         ED Medications     ED Medication Orders (From  admission, onward)      Ordered Start     Status Ordering Provider    01/03/25 0641 01/03/25 0642  ibuprofen (CHILDRENS MOTRIN,ADVIL) 100 MG/5ML suspension 126 mg  ONCE         Last MAR action: Given LAKIA CASTELAN            Critical Care       No Critical Care        Clinical Impression     ED Diagnosis   1. Non-recurrent acute suppurative otitis media of right ear without spontaneous rupture of tympanic membrane              Discussed with family regarding patient's diagnosis and all results. All questions answered.    I personally reviewed pertinent: hospital documentation, lab results, imaging studies    Disposition     Discharge 1/3/2025  7:47 AM  Estela Manzo discharge to home/self care.          Follow Up if discharged from the ED:   [facility] PEDIATRIC EMERGENCY DEPT  [address] [place] [id number] [phone]  Go to   If symptoms worsen    Your Child's pediatrician    Call in 1 day  after ER visit to ensure your child's condition is improving      New Prescriptions    AMOXICILLIN (AMOXIL) 400 MG/5ML SUSPENSION    Take 7 mLs by mouth in the morning and 7 mLs in the evening. Do all this for 5 days.               Teaching-Supervisory Addendum:  I participated in the following activities of this patient care: The medical history, the physical exam, medical decision-making, the procedure(s).    I personally performed: Supervision of the patient's care, the medical history, the physical exam, the medical decision making.    I personally reviewed pertinent: hospital documentation, lab results, imaging studies    The case was discussed with: The resident (or NP)    Procedures: I directly supervised any procedure(s) noted by resident (or NP)    Evaluation and management service: I agree with the evaluation and management decisions made in this patient's care.             Michael Ga MD  01/03/25 0758     No

## 2025-02-19 NOTE — ASU PREOP CHECKLIST - BP NONINVASIVE DIASTOLIC (MM HG)
S/w pt & wanting to try Omnipod. Melissa ordered prescriptions to [facility]. Pt wanted to speak with you first before scheduling a pump start. Has some additional questions about the Omnipod.   66

## 2025-04-16 NOTE — ASU DISCHARGE PLAN (ADULT/PEDIATRIC) - FOLLOW UP APPOINTMENTS
609 Assistance OOB with selected safe patient handling equipment if applicable/Assistance with ambulation/Communicate fall risk and risk factors to all staff, patient, and family/Provide visual cue: yellow wristband, yellow gown, etc/Reinforce activity limits and safety measures with patient and family/Call bell, personal items and telephone in reach/Instruct patient to call for assistance before getting out of bed/chair/stretcher/Non-slip footwear applied when patient is off stretcher/[place] to call system/Physically safe environment - no spills, clutter or unnecessary equipment/Purposeful Proactive Rounding/Room/bathroom lighting operational, light cord in reach

## 2025-06-05 NOTE — ASU DISCHARGE PLAN (ADULT/PEDIATRIC) - BATHING
Patient: Anisha Quevedo    [id number]  : 1996 -- AGE 28 y.o.    Provider: Jackie LEUNG CNP     Location [facility]   Service Date: 25       Department of Medicine  Division of Pulmonary, Critical Care, and Sleep Medicine       [facility]  Follow Up Visit Note      HISTORY OF PRESENT ILLNESS     HISTORY OF PRESENT ILLNESS   Anisha Quevedo is a 28 y.o. female who has a history of cerebral palsy, asthma, she is a never smokers, who presents to a [facility] for a follow up evaluation for asthma.      Current History    Since last visit respiratory status is stable. Nurse spoke with me about concerns of Ms. Quevedo not taking her inhaler and breathing treatments properly even with coaching on taking deep breaths or holding in medication to get proper dose.  Nurses asking if there is an oral and albuterol option. nurse reports that there is attention seeking issues with Ms Quevedo, she sees psychiatry.  Nurse reports Ms. Quevedo gets vest therapy twice daily, Breo, DuoNebs 3 times a day and acetylcysteine 3 times a day, nurse also expresses that Mr. Quevedo does not like to cough and will try to hold it in, or if she coughs up mucus she swallows it, this causes her to vomit.  They are using Acapella device as needed.    10/30/24: Since last visit she reports feeling back to baseline after having bilateral pneumonia in  and Covid? in August. She started Breo 200, gets nebs 3 times a day, percussion vest BID and mucinex as needed. Reports intermittent cough that is norm, reports chest pain yesterday and this morning, nurse came to assess her and give her breathing treatments. Will refer to cardiology for recurrent chest pain.    24: Since last visit she had an ED visit for an asthma exacerbation/pneumonia on 2024.  She was treated with duo nebs, steroids and magnesium.  CT neg for PE.  Today caregiver is speaking for Mrs.  Sae due to her not feeling well and COVID going around the home and where she lives then.  She states she tested negative for COVID but is resting now.    Since hospital stay she is no longer short of breath.  Seen PCP who heard Runk Clay in upper airways and extended prednisone 10 mg daily.  Nurse states that she is breathing more shallow and not taking deep breaths during breathing treatments but they are encouraging her to try to take deeper breaths during treatments. She has a weak cough and unable to cough mucus out despite using acapella and mucinex. Nurse states that she sounds congested despite nebs, acapella and mucinex. Her SpO2 today 95% on room air. Patient has tried and failed acapella flutter device as well as mucinex for mucus releif. Has an insufficient expiratory drive 2/2 cerebral palsy.     12/6/23: On today's visit, the patient is accompanied by a caregiver.  I called and spoke with her nurse at the facility she lives in to discuss the course of the last few months. She had a URI on 11/21/2023, PCP heard rhonchi on exam, she took a steroid burst  and Z-Waylon for 5 days. She reports feeling the same from a respiratory standpoint.  Occasional wheezing and cough. States she feels like her mucus is stuck in chest.  Denies shortness of breath at rest she did have a hospital visit a few months ago for the flu.  Today she is reporting sharp chest pain that is constant.  She has a history of anxiety and is not sure if it is anxiety related. Her nurse reports she has had a full cardiac workup for chest pains over the past 2 years and has been cleared by cardiology.    5/25/23: Mrs. Quevedo who has cerebral palsy and wheel chair bound is here accompanied by her caregiver. She uses symbicort 2 puffs twice a day. Duonebs twice a day. She reports an occasional cough. She says she noticed yesterday that she felt a little congested and SOB, duonebs helped. Denies wheezing, fevers chills, chest pain and ER visits for  breathing issues.      Previous pulmonary history: She was previously told she has asthma. She currently is on no supplemental oxygen. She has never been to pulmonary rehab. Does not recall having AECOPD requiring antibiotics or prednisone.     Inhalers/nebulized medications: symbicort, Duo-nebs     Hospitalization History: She has not been hospitalized over the last year for breathing related problem.     Sleep history: Denies snoring, apnea, feeling tired during the day or taking naps during the day.        REVIEW OF SYSTEMS     REVIEW OF SYSTEMS  Review of Systems    Constitutional: No fever, no chills, no night sweats.    Eyes: No double vision, no floaters, no dry eyes.   ENT: See HPI.   Neck: No neck stiffness.  Cardiovascular: No sharp chest pain, no heart racing, no leg swelling.  Respiratory: as noted in HPI.   Integumentary: No rashes or sores.  Neurological: No dizziness, no headaches. Sleeping well.  Psychiatric: No mood changes.     ALLERGIES AND MEDICATIONS     ALLERGIES  No Known Allergies    MEDICATIONS  Current Outpatient Medications   Medication Sig Dispense Refill    acetaminophen (Tylenol) 325 mg tablet Take 1-2 tablets (325-650 mg) by mouth every 4 hours if needed for fever (temp greater than 38.0 C) (or pain). No more than 3000 mg per day      acetylcysteine (Mucomyst) 200 mg/mL (20 %) nebulizer solution       alum-mag hydroxide-simeth (Maalox MAX) 400-400-40 mg/5 mL suspension Take 15 mL by mouth once daily as needed.      ammonium lactate (Amlactin) 12 % cream Apply 1 Application topically once daily. Apply to right heal      azelastine (Astelin) 137 mcg (0.1 %) nasal spray Administer 2 sprays into each nostril once daily. Use in each nostril as directed 30 mL 11    baclofen (Lioresal) 20 mg tablet Take 1 tablet (20 mg) by mouth 2 times a day. 60 tablet 11    benzonatate (Tessalon) 100 mg capsule Take 1 capsule (100 mg) by mouth 3 times a day as needed for cough. Do not crush or chew. 20  capsule 0    cholecalciferol (Vitamin D3) 50 mcg (2,000 unit) capsule Take by mouth once daily.      ciclopirox (Loprox) 0.77 % gel Apply 1 Application topically once daily. Apply to right great toenail      docusate sodium (Colace) 100 mg capsule Take 1 capsule (100 mg) by mouth 2 times a day as needed for constipation. Over the Counter 60 capsule 1    famotidine (Pepcid) 20 mg tablet Take 1 tablet (20 mg) by mouth once daily. Take on an empty stomach 90 tablet 0    ferrous sulfate 325 (65 Fe) MG tablet Take 1 tablet by mouth once daily.      fluticasone (Flonase) 50 mcg/actuation nasal spray Administer 2 sprays into each nostril once daily. Shake gently. Before first use, prime pump. After use, clean tip and replace cap. 16 g 12    fluticasone furoate-vilanteroL (Breo Ellipta) 200-25 mcg/dose inhaler Inhale 1 puff once daily. Rinse mouth with water after use to reduce aftertaste and incidence of candidiasis. Do not swallow. 1 each 3    hydrOXYzine HCL (Atarax) 50 mg tablet Take 1 tablet (50 mg) by mouth once daily at bedtime. 30 tablet 11    inhaler,assist device,lg mask (AEROCHAMBER PLUS FLOW-VU,L MSK MISC) Use as directed with inhaler      ipratropium-albuteroL (Duo-Neb) 0.5-2.5 mg/3 mL nebulizer solution Take 3 mL by nebulization 3 times a day. 180 mL 2    lidocaine HCL 4 % adhesive patch,medicated Apply 1 patch topically once daily as needed (rib pain). 30 patch 1    loperamide (Imodium) 2 mg capsule       LORazepam (Ativan) 1 mg tablet Take 1 tablet (1 mg) by mouth as needed every 6 hours for symptoms of psychosis (reported hallucinations, paranoid thinking, severe anxiety, screaming/yelling, and excessive reassurance-seeking). 60 tablet 0    melatonin 5 mg capsule Take 1 capsule (5 mg) by mouth once daily at bedtime. 30 capsule 11    metoprolol tartrate (Lopressor) 25 mg tablet Take 0.5 tablets (12.5 mg) by mouth 2 times a day.      montelukast (Singulair) 10 mg tablet Take 1 tablet (10 mg) by mouth once  daily at bedtime.      multivitamin tablet Take 1 tablet by mouth every other day.      mupirocin (Bactroban) 2 % ointment Apply a pea-size amount inside both nostrils once a day      naproxen (Naprosyn) 250 mg tablet       nystatin (Mycostatin) 100,000 unit/gram powder Apply 1 Application topically if needed for itching. 15 g 3    omeprazole OTC (PriLOSEC OTC) 20 mg EC tablet Take 2 tablets (40 mg) by mouth once daily in the morning. Take before meals AND 1 tablet (20 mg) once daily in the evening.  Take before meals. Take 30-60 minutes before breakfast. 90 tablet 11    ondansetron (Zofran) 4 mg tablet       polyethylene glycol (Glycolax, Miralax) 17 gram/dose powder Take 17 g by mouth 2 times a day. Over the counter 850 g 11    Refresh Plus 0.5 % ophthalmic solution Administer 1 drop into both eyes 2 times a day.      risperiDONE (RisperDAL) 1 mg tablet Take 1 tablet (1 mg) by mouth twice daily - morning and 2pm. 60 tablet 5    risperiDONE (RisperDAL) 4 mg tablet Take 1 tablet (4 mg) by mouth once daily at bedtime. 30 tablet 5    sodium chloride (Nasal Moisturizing) 0.65 % nasal spray Administer 1 spray into each nostril 2 times a day as needed (for dryness).      albuterol 2.5 mg /3 mL (0.083 %) nebulizer solution Take 3 mL (2.5 mg) by nebulization every 4 hours if needed for wheezing. 300 mL 1     No current facility-administered medications for this visit.         PAST HISTORY     PAST MEDICAL HISTORY  Asthma  Anxiety  Cerebral palsy  GERD   Psychosis  Schizophrenia  Mild Intellectual development disorder    PAST SURGICAL HISTORY  Past Surgical History:   Procedure Laterality Date    ESOPHAGOGASTRODUODENOSCOPY  05/29/2024    OTHER SURGICAL HISTORY  03/03/2021    Hip surgery    OTHER SURGICAL HISTORY  08/22/2019    Percutaneous endoscopic gastrostomy tube insertion    OTHER SURGICAL HISTORY  02/19/2020    Heart surgery    SPINAL FIXATION SURGERY  07/25/2014    Spinal Arthrodesis For Deformity By Posterior  Approach       IMMUNIZATION HISTORY  Immunization History   Administered Date(s) Administered    COVID-19, mRNA, LNP-S, PF, 30 mcg/0.3 mL dose 11/05/2021    Flu vaccine (IIV4), preservative free *Check age/dose* 10/07/2020, 10/04/2021, 09/09/2022, 10/11/2022, 11/01/2023    Flu vaccine, trivalent, preservative free, age 6 months and greater (Fluarix/Fluzone/Flulaval) 12/23/2009    Hepatitis A vaccine, pediatric/adolescent (HAVRIX, VAQTA) 06/19/2013, 09/03/2014    Influenza, seasonal, injectable 12/08/2006, 12/05/2008    Meningococcal ACWY vaccine (MENVEO) 09/03/2014    Meningococcal ACWY-D (Menactra) 4-valent conjugate vaccine 12/05/2008    Novel influenza-H1N1-09, preservative-free 12/23/2009    Pfizer COVID-19 vaccine, 12 years and older, (30mcg/0.3mL) (Comirnaty) 11/01/2023    Pfizer COVID-19 vaccine, bivalent, age 12 years and older (30 mcg/0.3 mL) 10/11/2022    Pfizer Purple Cap SARS-CoV-2 01/14/2021, 02/04/2021    Pneumococcal polysaccharide vaccine, 23-valent, age 2 years and older (PNEUMOVAX 23) 10/13/2020    RSV-MAb 11/01/2023    Tdap vaccine, age 7 year and older (BOOSTRIX, ADACEL) 12/05/2008, 10/13/2020    Varicella vaccine, subcutaneous (VARIVAX) 12/05/2008       SOCIAL HISTORY  Tobacco Smoking: never  Illicit drugs: none  Alcohol consumption: none  Pets: none  Living situation: lives at [facility]     OCCUPATIONAL/ENVIRONMENTAL HISTORY  Occupation: Disability.  No have known exposure to asbestos, silica, beryllium or inhaled metals.    FAMILY HISTORY  Family History   Problem Relation Name Age of Onset    Hypertension Sister Taffy     Leukemia Sister Taffy     Schizophrenia Sister Taffyoan      No have a family history of pulmonary disease.  No have a family history of cancer.    PHYSICAL EXAM     VITAL SIGNS:   There were no vitals filed for this visit.        PREVIOUS WEIGHTS: There is no height or weight on file to calculate BMI.    Wt Readings from Last 3 Encounters:   05/02/25 75.8 kg (167 lb)    04/01/25 77.6 kg (171 lb)   03/27/25 78 kg (172 lb)       Physical Exam    Constitutional: General appearance: Alert and oriented.  No acute distress. Well developed, well nourished.  Head and face: Symmetric  Pulmonary: Chest is normal. No increased work of breathing or signs of respiratory distress. Clear to auscultation bilaterally - no crackles, wheezing, or rhonchi.   Cardiovascular: Heart rate and rhythm normal. Normal S1, S2 - no murmurs, gallops, or pericardial rub.   Abdomen: Soft, non tender, +BS  Extremities: No edema. No clubbing or cyanosis of the fingernails.    Neurologic: Moves all four extremities   MSK: Normal movements of extremities. Gait normal   Psychiatric: Intact judgement and insight.    RESULTS/DATA     Pulmonary Function Test Results   Pulmonary function testing    Pulmonary Function Test - Scan on 12/1/2023  2:55 PM: Pulmonary Function Test        Chest Radiograph   XR chest 1 view  Status: Final result     PACS Images     Show images for XR chest 1 view  Signed by    Signed Time Phone Pager   Yosvany Camacho MD, MS 1/27/2025 12:28 [phone]      Exam Information    Status Exam Begun Exam Ended   Final 1/27/2025 11:15 1/27/2025 11:25     Study Result    Narrative & Impression   STUDY:  Chest Radiograph;  1/27/25 at 11:26 AM  INDICATION:  Chest pain, shortness of breath.  Evaluate for pneumonia and rib  fracture.  COMPARISON:  Chest XR 8/16/24.  ACCESSION NUMBER(S):  [id number]  ORDERING CLINICIAN:  RETA AGUIAR  TECHNIQUE:  Frontal chest was obtained at 1125 hours.  FINDINGS:  Frontal view of the chest demonstrate the mediastinum is unremarkable.   No focal consolidations, pleural effusions pneumothorax or  interstitial edema.  There are bilateral metallic fixating rods and  cerclage wires found throughout the thoracic spine that extends into  the lumbar spine.  IMPRESSION:  No acute cardiopulmonary process.  Signed by Yosvany Camacho MD       Chest CT Scan      Interpreted By:  Kaleb Rivera,   STUDY:  CT ANGIO CHEST FOR PULMONARY EMBOLISM;  8/16/2024 10:37 pm      INDICATION:  Signs/Symptoms:elevated dimer.      COMPARISON:  CT scan of the chest 06/07/2024      ACCESSION NUMBER(S):  [id number]      ORDERING CLINICIAN:  JOEY PAYNE      TECHNIQUE:  Helical data acquisition of the chest was obtained after intravenous  administration of  75 mL of Omnipaque 350. Images were reformatted in  axial, coronal, and sagittal planes. Axial and coronal MIPS were  reconstructed and reviewed.      FINDINGS:  HEART AND VESSELS:  No acute pulmonary embolism to the  proximal segmental arterial  level. Evaluation distally is limited.      Main pulmonary artery and its branches are normal in caliber.      The thoracic aorta is of normal course and caliber  without vascular  calcifications.      No coronary artery calcifications are seen.The study is not optimized  for evaluation of coronary arteries.      The cardiac chambers are not enlarged.      No evidence of pericardial effusion.      MEDIASTINUM AND GEOVANI, LOWER NECK AND AXILLA:  The visualized thyroid gland is within normal limits.      No evidence of thoracic lymphadenopathy by CT criteria.      Esophagus appears within normal limits as seen.      LUNGS AND AIRWAYS:  The trachea and central airways are patent. No endobronchial lesion.      Motion artifact limits evaluation of the lung parenchyma. Bibasilar  atelectasis. No consolidation, effusion or pneumothorax. Subtle  ground-glass opacities in the upper lobes may relate to atelectasis.  Note is made of expiratory phase of imaging.      UPPER ABDOMEN:  The visualized subdiaphragmatic structures demonstrate no remarkable  findings.      CHEST WALL AND OSSEOUS STRUCTURES:  Postsurgical changes of thoracolumbar spine fusion. S shaped  scoliosis. Surgical hardware causes significant beam hardening  artifact limiting evaluation of the adjacent soft tissues.  Multilevel  degenerative changes are present.      IMPRESSION:  1. No acute pulmonary embolism to the proximal segmental arterial  level.  2. Note is made of expiratory phase of imaging. Subtle ground-glass  opacities in the upper lobes favored to relate to atelectasis.  Correlate clinically if there is concern for superimposed infection.  3. Additional findings as described above.          MACRO:  None      Signed by: Kaleb Rivera 8/16/2024 11:00 PM  Dictation workstation:   [id number]    Echocardiogram     [facility], [address] [place] [address]              Tel [phone] and Fax [phone]     TRANSTHORACIC ECHOCARDIOGRAM REPORT        Patient Name:     JB BURRELL  Reading           [id number] Saad Rao                                 Physician:        MD  Study Date:       5/3/2021     Referring         CASSIDY RAMIREZ                                 Physician:  MRN/PID:          [id number]     PCP:  Accession/Order#: [id number] Department        [facility]                                 Location:  YOB: 1996    Fellow:  Gender:           F            Nurse:  Admit Date:                    Sonographer:      Mayelin Garcia [facility]  Admission Status: Outpatient   Additional Staff:  Height:           149.86 cm    CC Report to:  Weight:           64.86 kg     Study Type:       Echocardiogram  BSA:              1.60 m2  Blood Pressure: 99 /69 mmHg     Diagnosis/ICD: R07.9-Chest pain, unspecified  Indication:    Chest Pain  Procedure/CPT: Echo Complete w Full [id number]     Patient History:  Pertinent History: Chest Pain and Dyspnea. COVID-19 recovered (12/16/2020),                     tachycardia, schizophrenia, cerebral palsy-quadriplegic.     Study Detail: The following Echo studies were performed: 2D, M-Mode, Doppler and                color flow. Technically challenging study due to patient sitting                in wheel chair.        PHYSICIAN  INTERPRETATION:  Left Ventricle: The left ventricular systolic function is normal, with an estimated ejection fraction of 65-70%. There are no regional wall motion abnormalities. The left ventricular cavity size is normal. There is left ventricular concentric remodeling. Spectral Doppler shows a normal pattern of left ventricular diastolic filling.  Left Atrium: The left atrium is normal in size.  Right Ventricle: The right ventricle is normal in size. There is normal right ventricular global systolic function.  Right Atrium: The right atrium is normal in size.  Aortic Valve: The aortic valve is trileaflet. There is no evidence of aortic valve regurgitation. The peak instantaneous gradient of the aortic valve is 4.5 mmHg. The mean gradient of the aortic valve is 2.7 mmHg.  Mitral Valve: The mitral valve is normal in structure. There is no evidence of mitral valve regurgitation.  Tricuspid Valve: The tricuspid valve is structurally normal. There is trace tricuspid regurgitation.  Pulmonic Valve: The pulmonic valve is structurally normal. There is physiologic pulmonic valve regurgitation.  Pericardium: There is no pericardial effusion noted.  Aorta: The aortic root is normal.  Pulmonary Artery: The tricuspid regurgitant velocity is 2.38 m/s, and with an estimated right atrial pressure of 3 mmHg, the estimated pulmonary artery pressure is normal with the RVSP at 25.6 mmHg.  Systemic Veins: The inferior vena cava appears to be of normal size. There is IVC inspiratory collapse greater than 50%.        CONCLUSIONS:   1. The left ventricular systolic function is normal with a 65-70% estimated ejection fraction.     QUANTITATIVE DATA SUMMARY:  2D MEASUREMENTS:                           Normal Ranges:  Ao Root d:     2.20 cm   (2.0-3.7cm)  LAs:           1.57 cm   (2.7-4.0cm)  IVSd:          1.03 cm   (0.6-1.1cm)  LVPWd:         1.18 cm   (0.6-1.1cm)  LVIDd:         2.59 cm   (3.9-5.9cm)  LVIDs:         1.90 cm  LV Mass  No change Index: 49.2 g/m2  LV % FS        26.6 %     LA VOLUME:                                Normal Ranges:  LA Vol A4C:        32.1 ml    (22+/-6mL/m2)  LA Vol A2C:        26.7 ml  LA Vol BP:         29.3 ml  LA Vol Index A4C:  20.1 ml/m2  LA Vol Index A2C:  16.7 ml/m2  LA Vol Index BP:   18.3 ml/m2  LA Area A4C:       12.3 cm2  LA Area A2C:       11.2 cm2  LA Major Axis A4C: 4.0 cm  LA Major Axis A2C: 4.0 cm  LA Volume Index:   18.3 ml/m2  LA Vol A4C:        29.4 ml  LA Vol A2C:        25.9 ml     AORTA MEASUREMENTS:                     Normal Ranges:  Asc Ao, d: 2.30 cm (2.1-3.4cm)     LV DIASTOLIC FUNCTION:                         Normal Ranges:  MV Peak E:    0.77 m/s (0.7-1.2 m/s)  MV Peak A:    0.77 m/s (0.42-0.7 m/s)  E/A Ratio:    0.99     (1.0-2.2)  MV e'         0.08 m/s (>8.0)  MV lateral e' 0.09 m/s  MV medial e'  0.08 m/s  E/e' Ratio:   9.02     (<8.0)     AORTIC VALVE:                                    Normal Ranges:  AoV Vmax:                1.07 m/s (<1.7m/s)  AoV Peak P.5 mmHg (<20mmHg)  AoV Mean P.7 mmHg (1.7-11.5mmHg)  LVOT Max Vega:            0.79 m/s (<1.1m/s)  AoV VTI:                 16.86 cm (18-25cm)  LVOT VTI:                12.63 cm  LVOT Diameter:           1.60 cm  (1.8-2.4cm)  AoV Area, VTI:           1.52 cm2 (2.5-5.5cm2)  AoV Area,Vmax:           1.50 cm2 (2.5-4.5cm2)  AoV Dimensionless Index: 0.75     RIGHT VENTRICLE:  TAPSE: 19.0 mm  RV s'  0.15 m/s     TRICUSPID VALVE/RVSP:                              Normal Ranges:  Peak TR Velocity: 2.38 m/s  RV Syst Pressure: 25.6 mmHg (< 30mmHg)  IVC Diam:         1.40 cm     PULMONIC VALVE:                       Normal Ranges:  PV Max Vega: 0.8 m/s  (0.6-0.9m/s)  PV Max P.3 mmHg        [id number] Saad Rao MD  Electronically signed on 5/3/2021 at 2:44:25 PM          Labwork   Complete Blood Count  Lab Results   Component Value Date    WBC 7.0 2025    HGB 13.6 2025    HCT 43.6 2025     "MCV 96 01/27/2025     01/27/2025       Peripheral Eosinophil Count/Percentage:   Eosinophils Absolute (x10*3/uL)   Date Value   01/27/2025 0.01     Eosinophils % (%)   Date Value   01/27/2025 0.1       Serum Immunoglobulin E:    No results found for: \"IGE\"       ASSESSMENT/PLAN     Ms. Quevedo is a 28 y.o. female, who has a history of cerebral palsy.  She is a never smoker, who presents to a [facility] for a follow up evaluation for asthma.      Problem List and Orders  Problem List Items Addressed This Visit    None        Assessment and Plan / Recommendations:  Problem List Items Addressed This Visit    None     Asthma; moderate obstruction on most recent PFTs  - Continue Breo 200 mcg 1 puff once daily - rinse mouth after each use  - Continue albuterol HFA ipratropium/albuterol nebulizers twice a day  - Continue montelukast 10 mg at bedtime  - Acapella -ok to use PRN after breathing treatments for mucus relief  - Mucinex PRN for every 12 hours for mucus relief      - Continue percussion vest BID for mucus relief    Follow up in 6 months or sooner if needed.    If you have any questions please call the office [phone]    Thank you for visiting the Pulmonary clinic today!   Jackie Ortiz CNP  [phone]     Electronically signed  "

## (undated) DEVICE — Device

## (undated) DEVICE — GLV 8 PROTEXIS (WHITE)

## (undated) DEVICE — LAP PAD 18 X 18"

## (undated) DEVICE — TUBING HYDRO-SURG PLUS IRRIGATOR W SMOKEVAC & PROBE

## (undated) DEVICE — PRESSURE INFUSOR BAG 1000ML

## (undated) DEVICE — VENODYNE/SCD SLEEVE CALF MEDIUM

## (undated) DEVICE — ENDOCATCH 10MM SPECIMEN POUCH

## (undated) DEVICE — PACK MINOR

## (undated) DEVICE — GLV 7.5 PROTEXIS (WHITE)

## (undated) DEVICE — TUBING INSUFLATOR VIDEO TOWER NON HEATED

## (undated) DEVICE — DRAPE LIGHT HANDLE COVER (GREEN)

## (undated) DEVICE — TROCAR COVIDIEN VERSAONE BLADED FIXATION 11MM STANDARD

## (undated) DEVICE — SPECIMEN CONTAINER PET

## (undated) DEVICE — SOLIDIFIER CANN EXPRESS 3K

## (undated) DEVICE — SCOPE WARMER SEAL DISP

## (undated) DEVICE — SOL IRR POUR NS 0.9% 500ML

## (undated) DEVICE — PREP CHLORAPREP HI-LITE ORANGE 26ML

## (undated) DEVICE — WARMING BLANKET UPPER ADULT

## (undated) DEVICE — DISSECTOR ENDO PEANUT 5MM

## (undated) DEVICE — DRSG STERISTRIPS 0.5 X 4"

## (undated) DEVICE — TUBING W FILTER STERILE FOR INSUFFLATION

## (undated) DEVICE — GLV 6.5 PROTEXIS (WHITE)

## (undated) DEVICE — POSITIONER STRAP ARMBOARD VELCRO TS-30

## (undated) DEVICE — SUT POLYSORB 0 30" GS-23

## (undated) DEVICE — SOL IRR POUR H2O 1500ML

## (undated) DEVICE — POSITIONER FOAM HEAD CRADLE (PINK)

## (undated) DEVICE — TUBING IV EXTENSION 30"

## (undated) DEVICE — TROCAR COVIDIEN VERSASTEP 5MM STANDARD

## (undated) DEVICE — TROCAR COVIDIEN VERSASTEP PLUS 11MM STANDARD

## (undated) DEVICE — CANISTER SUCTION LID GUARD 3000CC

## (undated) DEVICE — INSUFFLATION NDL COVIDIEN STEP 14G FOR STEP/VERSASTEP